# Patient Record
Sex: FEMALE | Race: WHITE | NOT HISPANIC OR LATINO
[De-identification: names, ages, dates, MRNs, and addresses within clinical notes are randomized per-mention and may not be internally consistent; named-entity substitution may affect disease eponyms.]

---

## 2017-12-20 PROBLEM — Z00.00 ENCOUNTER FOR PREVENTIVE HEALTH EXAMINATION: Status: ACTIVE | Noted: 2017-12-20

## 2017-12-21 ENCOUNTER — APPOINTMENT (OUTPATIENT)
Dept: GASTROENTEROLOGY | Facility: CLINIC | Age: 82
End: 2017-12-21
Payer: MEDICARE

## 2017-12-21 VITALS
OXYGEN SATURATION: 98 % | BODY MASS INDEX: 18.27 KG/M2 | WEIGHT: 107 LBS | HEIGHT: 64 IN | HEART RATE: 67 BPM | TEMPERATURE: 98.1 F | DIASTOLIC BLOOD PRESSURE: 70 MMHG | RESPIRATION RATE: 14 BRPM | SYSTOLIC BLOOD PRESSURE: 120 MMHG

## 2017-12-21 DIAGNOSIS — Z82.49 FAMILY HISTORY OF ISCHEMIC HEART DISEASE AND OTHER DISEASES OF THE CIRCULATORY SYSTEM: ICD-10-CM

## 2017-12-21 DIAGNOSIS — Z82.3 FAMILY HISTORY OF STROKE: ICD-10-CM

## 2017-12-21 PROCEDURE — 99205 OFFICE O/P NEW HI 60 MIN: CPT

## 2017-12-21 RX ORDER — BIMATOPROST 0.1 MG/ML
0.01 SOLUTION/ DROPS OPHTHALMIC
Refills: 0 | Status: ACTIVE | COMMUNITY

## 2017-12-21 RX ORDER — LEVOTHYROXINE SODIUM 0.03 MG/1
25 TABLET ORAL
Refills: 0 | Status: ACTIVE | COMMUNITY

## 2019-01-15 ENCOUNTER — APPOINTMENT (OUTPATIENT)
Dept: GASTROENTEROLOGY | Facility: CLINIC | Age: 84
End: 2019-01-15
Payer: MEDICARE

## 2019-01-15 VITALS
HEIGHT: 64 IN | BODY MASS INDEX: 17.93 KG/M2 | RESPIRATION RATE: 14 BRPM | DIASTOLIC BLOOD PRESSURE: 60 MMHG | OXYGEN SATURATION: 96 % | HEART RATE: 82 BPM | SYSTOLIC BLOOD PRESSURE: 120 MMHG | WEIGHT: 105 LBS | TEMPERATURE: 97.4 F

## 2019-01-15 DIAGNOSIS — R10.9 UNSPECIFIED ABDOMINAL PAIN: ICD-10-CM

## 2019-01-15 PROCEDURE — 99215 OFFICE O/P EST HI 40 MIN: CPT

## 2019-01-15 RX ORDER — PANCRELIPASE LIPASE, PANCRELIPASE PROTEASE, PANCRELIPASE AMYLASE 10000; 32000; 42000 [USP'U]/1; [USP'U]/1; [USP'U]/1
10000-32000 CAPSULE, DELAYED RELEASE ORAL
Qty: 480 | Refills: 0 | Status: ACTIVE | COMMUNITY
Start: 2018-11-07

## 2019-01-15 NOTE — HISTORY OF PRESENT ILLNESS
[FreeTextEntry1] : This is the first [facility] GI clinic visit for Ms. Godinez. She presents to the GI clinic with persistent complaints. She continues to experience episodic pain radiating from the LLQ to the mid back. The pain waxes and wanes in intensity. At its worse, she describes 6-7/10. She experiences relief from completely evacuating her bowels and more relief from Tylenol. All attempts to threat this using cathartics/laxatives, IC valve massage and hydrotherapy have not been successful. She is contemplating chemotherapy starting tomorrow for treatment of pancreatic cancer. Ms. Godinez presents today seeking relief from these symptoms.

## 2019-01-15 NOTE — ASSESSMENT
[FreeTextEntry1] : The Histor and physical examination are very much consistent with neuropathic pain. The dermo tonal pattern suggests a T4-T6 afferent. The etiology of the pain is unclear but may be related to prior surgery or persistent lymphadenopathy. Abdominal distension from constipation or SIBO clearly worsen her complaints. These will need to be optimized in addition to treating her primary problem. She is starting chemo tomorrow so treatment for SIBO may be imprudent at this time. Simple treatments using topical agents may be quite helpful. Will consider a neuromodulator if symptoms persist. \par \par Plan:\par \par 1. OTC menthol/Lidoderm patches to affected areas. \par 2. Continue OTC analgesics. \par 3. Patient will discuss with her PCP.\par \par \par

## 2019-01-15 NOTE — PHYSICAL EXAM
[General Appearance - Alert] : alert [General Appearance - In No Acute Distress] : in no acute distress [Sclera] : the sclera and conjunctiva were normal [Hearing Threshold Finger Rub Not [place]] : hearing was normal [Neck Appearance] : the appearance of the neck was normal [Bowel Sounds] : normal bowel sounds [Abdomen Soft] : soft [] : no hepato-splenomegaly [Abdomen Mass (___ Cm)] : no abdominal mass palpated [FreeTextEntry1] : Patient lying supine with knees bent. Generalized abdominal tenderness.  [Abnormal Walk] : normal gait [Skin Color & Pigmentation] : normal skin color and pigmentation [Oriented To Time, Place, And Person] : oriented to person, place, and time [Impaired Insight] : insight and judgment were intact [Affect] : the affect was normal

## 2019-03-26 ENCOUNTER — APPOINTMENT (OUTPATIENT)
Dept: GASTROENTEROLOGY | Facility: CLINIC | Age: 84
End: 2019-03-26

## 2019-03-26 ENCOUNTER — APPOINTMENT (OUTPATIENT)
Dept: GASTROENTEROLOGY | Facility: CLINIC | Age: 84
End: 2019-03-26
Payer: MEDICARE

## 2019-03-26 VITALS
RESPIRATION RATE: 14 BRPM | HEIGHT: 64 IN | TEMPERATURE: 97.5 F | DIASTOLIC BLOOD PRESSURE: 52 MMHG | WEIGHT: 108 LBS | HEART RATE: 70 BPM | BODY MASS INDEX: 18.44 KG/M2 | SYSTOLIC BLOOD PRESSURE: 100 MMHG | OXYGEN SATURATION: 98 %

## 2019-03-26 DIAGNOSIS — Z85.07 PERSONAL HISTORY OF MALIGNANT NEOPLASM OF PANCREAS: ICD-10-CM

## 2019-03-26 DIAGNOSIS — K59.00 CONSTIPATION, UNSPECIFIED: ICD-10-CM

## 2019-03-26 PROCEDURE — 99214 OFFICE O/P EST MOD 30 MIN: CPT

## 2019-03-27 PROBLEM — K59.00 CONSTIPATION: Status: ACTIVE | Noted: 2017-12-21

## 2019-03-27 PROBLEM — Z85.07 HISTORY OF MALIGNANT NEOPLASM OF PANCREAS: Status: RESOLVED | Noted: 2017-12-21 | Resolved: 2019-03-27

## 2019-03-27 NOTE — PHYSICAL EXAM
[General Appearance - Alert] : alert [General Appearance - In No Acute Distress] : in no acute distress [Sclera] : the sclera and conjunctiva were normal [Hearing Threshold Finger Rub Not [place]] : hearing was normal [Neck Appearance] : the appearance of the neck was normal [Bowel Sounds] : normal bowel sounds [Abdomen Soft] : soft [Abdomen Tenderness] : non-tender [] : no hepato-splenomegaly [Abdomen Mass (___ Cm)] : no abdominal mass palpated [Abnormal Walk] : normal gait [Oriented To Time, Place, And Person] : oriented to person, place, and time [Impaired Insight] : insight and judgment were intact [Affect] : the affect was normal

## 2019-03-27 NOTE — ASSESSMENT
[FreeTextEntry1] :  Ms. Godinez is significantly improved. Her symptoms are less bothersome and her HRQOL is good. Her current management regimen is very satisfactory and no changes are cogent at the moment. She will continue to follow up with us as necessary. \par \par

## 2019-03-27 NOTE — HISTORY OF PRESENT ILLNESS
[FreeTextEntry1] : Ms. Godinez returns to the GI clinic in follow up. She is undergoing chemotherapy for pancreatic neoplasia and is happy with her progress. Her presenting symptoms have all significantly improved. Her bloating has responded to Atrantil and her bowel habits is significantly improved with Oxy Powder as rescue. She is eating well and has been able to gain over ten pounds since her lowest weight. She presents today in routine follow up.

## 2019-06-05 ENCOUNTER — OUTPATIENT (OUTPATIENT)
Dept: OUTPATIENT SERVICES | Facility: HOSPITAL | Age: 84
LOS: 1 days | End: 2019-06-05
Payer: MEDICARE

## 2019-06-05 ENCOUNTER — APPOINTMENT (OUTPATIENT)
Dept: CT IMAGING | Facility: HOSPITAL | Age: 84
End: 2019-06-05

## 2019-06-05 DIAGNOSIS — K21.9 GASTRO-ESOPHAGEAL REFLUX DISEASE W/OUT ESOPHAGITIS: ICD-10-CM

## 2019-06-05 PROCEDURE — 74177 CT ABD & PELVIS W/CONTRAST: CPT | Mod: 26

## 2019-06-05 PROCEDURE — 74177 CT ABD & PELVIS W/CONTRAST: CPT

## 2019-07-19 ENCOUNTER — APPOINTMENT (OUTPATIENT)
Dept: OTOLARYNGOLOGY | Facility: CLINIC | Age: 84
End: 2019-07-19
Payer: MEDICARE

## 2019-07-19 VITALS
HEART RATE: 75 BPM | BODY MASS INDEX: 16.39 KG/M2 | HEIGHT: 64 IN | DIASTOLIC BLOOD PRESSURE: 57 MMHG | WEIGHT: 96 LBS | SYSTOLIC BLOOD PRESSURE: 112 MMHG

## 2019-07-19 DIAGNOSIS — H93.293 OTHER ABNORMAL AUDITORY PERCEPTIONS, BILATERAL: ICD-10-CM

## 2019-07-19 PROCEDURE — G0268 REMOVAL OF IMPACTED WAX MD: CPT

## 2019-07-19 PROCEDURE — 92593: CPT | Mod: NC

## 2019-07-19 PROCEDURE — 99213 OFFICE O/P EST LOW 20 MIN: CPT | Mod: 25

## 2019-07-22 PROBLEM — H93.293 ABNORMAL AUDITORY PERCEPTION OF BOTH EARS: Status: ACTIVE | Noted: 2019-07-22

## 2019-07-22 NOTE — ASSESSMENT
[FreeTextEntry1] : Cerumen impaction, now resolved.  The patient was counseled in aural hygiene and Qtip avoidance.  I offered an audiogram, but the patient refused.\par \par

## 2020-01-28 NOTE — HISTORY OF PRESENT ILLNESS
Orchiectomy   WHAT YOU NEED TO KNOW:   Orchiectomy, also called orchidectomy, is surgery to remove one or both of your testicles  Your testicles are egg-shaped organs that lie inside your scrotum  They are supported by your spermatic cord  DISCHARGE INSTRUCTIONS:   Medicines:   · Antibiotics: This medicine is given to fight or prevent an infection caused by bacteria  Always take your antibiotics exactly as ordered by your healthcare provider  Do not stop taking your medicine unless directed by your healthcare provider  Never save antibiotics or take leftover antibiotics that were given to you for another illness  · Antihormone medicine:  Antihormone medicine may be given if your testosterone level does not decrease enough after your surgery  · Bone strengthening medicines:  Bisphosphonates, calcium, and vitamin D supplements may be needed after orchiectomy  These medicines help decrease bone loss and reduce your risk of broken bones  · Pain medicine: You may be given a prescription medicine to decrease pain  Do not wait until the pain is severe before you take this medicine  · Take your medicine as directed  Contact your healthcare provider if you think your medicine is not helping or if you have side effects  Tell him or her if you are allergic to any medicine  Keep a list of the medicines, vitamins, and herbs you take  Include the amounts, and when and why you take them  Bring the list or the pill bottles to follow-up visits  Carry your medicine list with you in case of an emergency  Follow up with your healthcare provider as directed: You may need x-ray scans and blood tests after your surgery  Write down your questions so you remember to ask them during your visits  Ice:  Ice helps decrease swelling and pain  Ice may also help prevent tissue damage  Use an ice pack or put crushed ice in a plastic bag   Cover it with a towel, and place it on your incision for 15 to 20 minutes every hour as [de-identified] : Patient seen for recurrent cerumen impactions. Feels as if her ears are clogged for the past few weeks.  Denies otalgia, otorrhea, tinnitus, or vertigo.  Patient has no previous otologic history or acoustic trauma.\par \par  directed  Wound care: You will have a bandage over your surgery site  Do not take the bandage off until your healthcare provider says it is okay  You may also need to wear a jock strap for support and comfort  Ask for more information about caring for your wound and using your jock strap  Other treatments: If you have cancer, you may need the following after your orchiectomy:  · Chemotherapy: This medicine works by killing tumor cells that were not removed with surgery  Chemotherapy may also help cure cancer  · Radiation therapy: This is a treatment using x-rays or gamma rays  Radiation helps kill any cancer cells that may be left in your testicle area  Radiation will also help prevent any remaining cancer cells from spreading  Contact your healthcare provider if:   · You are nauseated or vomiting  · You have a cough or feel weak and achy  · You have a fever or chills  · You have questions or concerns about your condition or care  Seek care immediately or call 911 if:   · You have bad pain in your legs or your legs become very swollen  · You have lower abdominal or back pain that does not go away, even after you take medicine  · You have trouble urinating or having a bowel movement  · Your incision is swollen, red, bleeding, or has pus coming from it  · Your stitches come apart  · You feel lightheaded and short of breath  · You have chest pain when you take a deep breath or cough, or you cough up blood  · Your leg feels warm, tender, and painful  It may look swollen and red  © 2017 [address] [employer]  Information is for End User's use only and may not be sold, redistributed or otherwise used for commercial purposes  All illustrations and images included in CareNotes® are the copyrighted property of [employer]  or Rocco Castro  The above information is an  only   It is not intended as medical advice for individual conditions or treatments  Talk to your doctor, nurse or pharmacist before following any medical regimen to see if it is safe and effective for you

## 2020-02-19 ENCOUNTER — APPOINTMENT (OUTPATIENT)
Dept: OTOLARYNGOLOGY | Facility: CLINIC | Age: 85
End: 2020-02-19
Payer: MEDICARE

## 2020-02-19 VITALS
SYSTOLIC BLOOD PRESSURE: 112 MMHG | HEIGHT: 64 IN | BODY MASS INDEX: 16.39 KG/M2 | HEART RATE: 51 BPM | DIASTOLIC BLOOD PRESSURE: 71 MMHG | WEIGHT: 96 LBS

## 2020-02-19 PROCEDURE — 69210 REMOVE IMPACTED EAR WAX UNI: CPT

## 2020-02-19 PROCEDURE — 99213 OFFICE O/P EST LOW 20 MIN: CPT | Mod: 25

## 2020-02-19 RX ORDER — DEXAMETHASONE 4 MG/1
4 TABLET ORAL
Qty: 12 | Refills: 0 | Status: DISCONTINUED | COMMUNITY
Start: 2019-01-16 | End: 2020-02-19

## 2020-02-19 RX ORDER — LEUCOVORIN CALCIUM 350 MG/17.5ML
350 INJECTION, POWDER, LYOPHILIZED, FOR SOLUTION INTRAMUSCULAR; INTRAVENOUS
Qty: 2 | Refills: 0 | Status: DISCONTINUED | COMMUNITY
Start: 2019-01-09 | End: 2020-02-19

## 2020-02-19 RX ORDER — ONDANSETRON 2 MG/ML
40 INJECTION INTRAMUSCULAR; INTRAVENOUS
Qty: 40 | Refills: 0 | Status: DISCONTINUED | COMMUNITY
Start: 2019-01-09 | End: 2020-02-19

## 2020-02-19 RX ORDER — ONDANSETRON 8 MG/1
8 TABLET ORAL
Qty: 30 | Refills: 0 | Status: DISCONTINUED | COMMUNITY
Start: 2019-01-16 | End: 2020-02-19

## 2020-02-19 RX ORDER — ATORVASTATIN CALCIUM 10 MG/1
10 TABLET, FILM COATED ORAL
Refills: 0 | Status: DISCONTINUED | COMMUNITY
End: 2020-02-19

## 2020-02-19 RX ORDER — ASPIRIN 325 MG/1
TABLET, FILM COATED ORAL
Refills: 0 | Status: DISCONTINUED | COMMUNITY
End: 2020-02-19

## 2020-02-19 RX ORDER — TIMOLOL MALEATE 5 MG/ML
0.5 SOLUTION OPHTHALMIC
Qty: 5 | Refills: 0 | Status: DISCONTINUED | COMMUNITY
Start: 2019-01-07 | End: 2020-02-19

## 2020-02-19 RX ORDER — FILGRASTIM-SNDZ 300 UG/.5ML
300 INJECTION, SOLUTION INTRAVENOUS; SUBCUTANEOUS
Qty: 5 | Refills: 0 | Status: DISCONTINUED | COMMUNITY
Start: 2019-01-17 | End: 2020-02-19

## 2020-02-19 RX ORDER — FLUOROURACIL 50 MG/ML
2.5 INJECTION, SOLUTION INTRAVENOUS
Qty: 100 | Refills: 0 | Status: DISCONTINUED | COMMUNITY
Start: 2019-01-09 | End: 2020-02-19

## 2020-02-19 RX ORDER — DEXAMETHASONE SODIUM PHOSPHATE 10 MG/ML
10 INJECTION INTRAMUSCULAR; INTRAVENOUS
Qty: 4 | Refills: 0 | Status: DISCONTINUED | COMMUNITY
Start: 2019-01-09 | End: 2020-02-19

## 2020-02-19 RX ORDER — PROCHLORPERAZINE MALEATE 10 MG/1
10 TABLET ORAL
Qty: 30 | Refills: 0 | Status: DISCONTINUED | COMMUNITY
Start: 2019-01-16 | End: 2020-02-19

## 2020-02-19 RX ORDER — DEXTROSE MONOHYDRATE 25000 MG/500ML
5 INJECTION, SOLUTION INTRAVENOUS
Qty: 2000 | Refills: 0 | Status: DISCONTINUED | COMMUNITY
Start: 2019-01-10 | End: 2020-02-19

## 2020-02-19 RX ORDER — PANTOPRAZOLE 40 MG/1
40 TABLET, DELAYED RELEASE ORAL
Qty: 30 | Refills: 0 | Status: DISCONTINUED | COMMUNITY
Start: 2019-01-16 | End: 2020-02-19

## 2020-02-19 RX ORDER — DOCUSATE SODIUM 100 MG/1
100 CAPSULE ORAL
Refills: 0 | Status: DISCONTINUED | COMMUNITY
End: 2020-02-19

## 2020-02-19 RX ORDER — OXALIPLATIN 100 MG/20ML
100 INJECTION, POWDER, LYOPHILIZED, FOR SOLUTION INTRAVENOUS
Qty: 2 | Refills: 0 | Status: DISCONTINUED | COMMUNITY
Start: 2019-01-09 | End: 2020-02-19

## 2020-02-19 RX ORDER — IRINOTECAN HYDROCHLORIDE 20 MG/ML
100 INJECTION INTRAVENOUS
Qty: 10 | Refills: 0 | Status: DISCONTINUED | COMMUNITY
Start: 2019-01-09 | End: 2020-02-19

## 2020-02-19 RX ORDER — LIDOCAINE AND PRILOCAINE 25; 25 MG/G; MG/G
2.5-2.5 CREAM TOPICAL
Qty: 30 | Refills: 0 | Status: DISCONTINUED | COMMUNITY
Start: 2019-01-16 | End: 2020-02-19

## 2020-02-19 RX ORDER — SODIUM CHLORIDE 9 G/ML
0.9 INJECTION, SOLUTION INTRAVENOUS
Qty: 500 | Refills: 0 | Status: DISCONTINUED | COMMUNITY
Start: 2019-01-10 | End: 2020-02-19

## 2020-02-19 RX ORDER — OMEPRAZOLE, SODIUM BICARBONATE 40; 1100 MG/1; MG/1
40-1100 CAPSULE ORAL DAILY
Qty: 30 | Refills: 0 | Status: DISCONTINUED | COMMUNITY
Start: 2019-06-05 | End: 2020-02-19

## 2020-02-19 RX ORDER — GEMCITABINE HYDROCHLORIDE 1 G/1
1 INJECTION, POWDER, LYOPHILIZED, FOR SOLUTION INTRAVENOUS
Qty: 2 | Refills: 0 | Status: DISCONTINUED | COMMUNITY
Start: 2019-01-09 | End: 2020-02-19

## 2020-02-19 RX ORDER — LIOTHYRONINE SODIUM 5 UG/1
5 TABLET ORAL
Qty: 120 | Refills: 0 | Status: DISCONTINUED | COMMUNITY
Start: 2018-02-07 | End: 2020-02-19

## 2020-02-19 NOTE — ASSESSMENT
[FreeTextEntry1] : Cerumen impaction was removed from the ears. She felt better afterwards\par \par PLAN\par \par -findings and management options discussed in detail with the patient. \par -good aural hygiene\par -wax removal drops as needed. \par -annual audiogram. She declined testing today.\par -I recommended that she see the audiologist to have her hearing aids checked\par -follow up in approximately 6 months\par -call and return earlier if any concerns. \par

## 2020-02-19 NOTE — HISTORY OF PRESENT ILLNESS
[de-identified] : MARY PRICE is a 87 year patient With history of bilateral sensorineural hearing loss. She uses hearing aids. She is here to check her ears for wax buildup. She has had no difficulty hearing. She has no otalgia or otorrhea.

## 2020-02-26 ENCOUNTER — APPOINTMENT (OUTPATIENT)
Dept: OTOLARYNGOLOGY | Facility: CLINIC | Age: 85
End: 2020-02-26

## 2020-03-13 ENCOUNTER — APPOINTMENT (OUTPATIENT)
Dept: OTOLARYNGOLOGY | Facility: CLINIC | Age: 85
End: 2020-03-13

## 2020-07-08 ENCOUNTER — APPOINTMENT (OUTPATIENT)
Dept: OTOLARYNGOLOGY | Facility: CLINIC | Age: 85
End: 2020-07-08

## 2020-10-05 ENCOUNTER — APPOINTMENT (OUTPATIENT)
Dept: OTOLARYNGOLOGY | Facility: CLINIC | Age: 85
End: 2020-10-05
Payer: SELF-PAY

## 2020-10-05 ENCOUNTER — APPOINTMENT (OUTPATIENT)
Dept: OTOLARYNGOLOGY | Facility: CLINIC | Age: 85
End: 2020-10-05
Payer: MEDICARE

## 2020-10-05 VITALS — BODY MASS INDEX: 16.39 KG/M2 | TEMPERATURE: 97.2 F | WEIGHT: 96 LBS | HEIGHT: 64 IN

## 2020-10-05 PROCEDURE — 99213 OFFICE O/P EST LOW 20 MIN: CPT | Mod: 25

## 2020-10-05 PROCEDURE — 92592: CPT | Mod: NC

## 2020-10-05 PROCEDURE — 92567 TYMPANOMETRY: CPT

## 2020-10-05 PROCEDURE — 69210 REMOVE IMPACTED EAR WAX UNI: CPT

## 2020-10-05 PROCEDURE — 92557 COMPREHENSIVE HEARING TEST: CPT

## 2020-10-05 RX ORDER — MANGANESE CHLORIDE 0.1 MG/ML
0.1 INJECTION, SOLUTION INTRAVENOUS
Qty: 20 | Refills: 0 | Status: DISCONTINUED | COMMUNITY
Start: 2019-12-04 | End: 2020-10-05

## 2020-10-05 RX ORDER — LIFITEGRAST 50 MG/ML
5 SOLUTION/ DROPS OPHTHALMIC
Qty: 60 | Refills: 0 | Status: DISCONTINUED | COMMUNITY
Start: 2019-10-31 | End: 2020-10-05

## 2020-10-05 RX ORDER — LEVOTHYROXINE SODIUM 0.05 MG/1
50 TABLET ORAL
Qty: 90 | Refills: 0 | Status: DISCONTINUED | COMMUNITY
Start: 2017-12-31 | End: 2020-10-05

## 2020-10-05 RX ORDER — CUPRIC CHLORIDE 0.4 MG/ML
0.4 INJECTION, SOLUTION INTRAVENOUS
Qty: 10 | Refills: 0 | Status: DISCONTINUED | COMMUNITY
Start: 2020-01-27 | End: 2020-10-05

## 2020-10-05 RX ORDER — CYCLOBENZAPRINE HYDROCHLORIDE 5 MG/1
5 TABLET, FILM COATED ORAL
Qty: 30 | Refills: 0 | Status: DISCONTINUED | COMMUNITY
Start: 2019-11-22 | End: 2020-10-05

## 2020-10-05 RX ORDER — NEPAFENAC 3 MG/ML
0.3 SUSPENSION/ DROPS OPHTHALMIC
Qty: 2 | Refills: 0 | Status: DISCONTINUED | COMMUNITY
Start: 2019-10-08 | End: 2020-10-05

## 2020-10-05 RX ORDER — DORZOLAMIDE HYDROCHLORIDE 20 MG/ML
2 SOLUTION OPHTHALMIC
Qty: 10 | Refills: 0 | Status: DISCONTINUED | COMMUNITY
Start: 2019-07-25 | End: 2020-10-05

## 2020-10-05 RX ORDER — NETARSUDIL AND LATANOPROST OPHTHALMIC SOLUTION, 0.02%/0.005% .2; .05 MG/ML; MG/ML
0.02-0.005 SOLUTION/ DROPS OPHTHALMIC; TOPICAL
Qty: 2 | Refills: 0 | Status: DISCONTINUED | COMMUNITY
Start: 2019-11-05 | End: 2020-10-05

## 2020-10-05 NOTE — ASSESSMENT
[FreeTextEntry1] : Cerumen impaction was removed bilaterally. Her hearing is stable. \par \par PLAN\par \par -findings and management options discussed in detail with the patient. \par -good aural hygiene\par -annual audiogram\par -follow up with the audiologist as needed to have her hearing aids checked. \par -follow up in 6 months to check for cerumen\par -call and return earlier if any concerns. \par \par

## 2020-10-05 NOTE — HISTORY OF PRESENT ILLNESS
[de-identified] : MARY PRICE is a 87 year patient Here for cerumen impaction and hearing loss. She has been doing well.  She has no otalgia or otorrhea.  She uses hearing aids. She only has the left one with her today. Her last audiogram was in 2018.

## 2021-03-31 ENCOUNTER — APPOINTMENT (OUTPATIENT)
Dept: OTOLARYNGOLOGY | Facility: CLINIC | Age: 86
End: 2021-03-31

## 2021-03-31 ENCOUNTER — APPOINTMENT (OUTPATIENT)
Dept: OTOLARYNGOLOGY | Facility: CLINIC | Age: 86
End: 2021-03-31
Payer: MEDICARE

## 2021-03-31 DIAGNOSIS — H61.21 IMPACTED CERUMEN, RIGHT EAR: ICD-10-CM

## 2021-03-31 PROCEDURE — 69210 REMOVE IMPACTED EAR WAX UNI: CPT

## 2021-03-31 PROCEDURE — 99212 OFFICE O/P EST SF 10 MIN: CPT | Mod: 25

## 2021-03-31 PROCEDURE — 92557 COMPREHENSIVE HEARING TEST: CPT

## 2021-03-31 PROCEDURE — 92567 TYMPANOMETRY: CPT

## 2021-03-31 NOTE — HISTORY OF PRESENT ILLNESS
[de-identified] : MARY PRICE is a 88 year patient with a history of bilateral sensorineural hearing loss. She uses hearing aid. She had a sudden decrease in hearing in her right ear a few days ago. She has no otalgia, otorrhea, tinnitus, or dizziness. She thinks it could be wax.

## 2021-03-31 NOTE — ASSESSMENT
[FreeTextEntry1] : She had cerumen in the right ear which was removed. Repeat audiogram showed no change in her hearing. The audiologist checked her hearing aid and there was blockage by wax in her hearing aid. She felt better after it was cleaned. \par \par PLAN\par \par -findings and management options discussed in detail with the patient. \par -good aural hygiene\par -annual audiogram\par -follow up with the audiologist as needed  \par -follow up in 6 months or earlier if needed

## 2021-04-05 ENCOUNTER — APPOINTMENT (OUTPATIENT)
Dept: OTOLARYNGOLOGY | Facility: CLINIC | Age: 86
End: 2021-04-05

## 2021-06-10 ENCOUNTER — APPOINTMENT (OUTPATIENT)
Dept: OTOLARYNGOLOGY | Facility: CLINIC | Age: 86
End: 2021-06-10
Payer: SELF-PAY

## 2021-06-10 PROCEDURE — 92593: CPT | Mod: NC

## 2021-10-04 ENCOUNTER — APPOINTMENT (OUTPATIENT)
Dept: OTOLARYNGOLOGY | Facility: CLINIC | Age: 86
End: 2021-10-04
Payer: MEDICARE

## 2021-10-04 PROCEDURE — 69210 REMOVE IMPACTED EAR WAX UNI: CPT

## 2021-10-04 PROCEDURE — 99212 OFFICE O/P EST SF 10 MIN: CPT | Mod: 25

## 2021-10-04 NOTE — HISTORY OF PRESENT ILLNESS
[de-identified] : MARY PRICE is a 88 year patient here for right ear pain when she puts her hearing aid in.  She has no otorrhea, tinnitus or dizziness.  \par \par ENT History\par She has bilateral SNHL and wears hearing aids

## 2021-10-04 NOTE — ASSESSMENT
[FreeTextEntry1] : Cerumen was removed from the ears.  THe pain resolved. \par \par PLAN\par \par -findings and management options discussed in detail with the patient. \par -good aural hygiene\par -annual audiogram\par -follow up with the audiologist as needed  to check her hearing aids\par -follow up in 6 months or earlier if needed

## 2021-11-19 ENCOUNTER — APPOINTMENT (OUTPATIENT)
Dept: OTOLARYNGOLOGY | Facility: CLINIC | Age: 86
End: 2021-11-19
Payer: SELF-PAY

## 2021-11-19 PROCEDURE — 92593: CPT | Mod: NC

## 2021-12-08 ENCOUNTER — APPOINTMENT (OUTPATIENT)
Dept: OTOLARYNGOLOGY | Facility: CLINIC | Age: 86
End: 2021-12-08
Payer: MEDICARE

## 2021-12-08 VITALS — WEIGHT: 112 LBS | HEIGHT: 64 IN | BODY MASS INDEX: 19.12 KG/M2

## 2021-12-08 PROCEDURE — 92504 EAR MICROSCOPY EXAMINATION: CPT

## 2021-12-08 PROCEDURE — 99212 OFFICE O/P EST SF 10 MIN: CPT | Mod: 25

## 2021-12-08 NOTE — ASSESSMENT
[FreeTextEntry1] : She has recurrent right ear pain. There was no infection or significant cerumen impaction. I discussed the possible etiologies of referred ear pain. She thinks it may be from her tooth.\par \par PLAN\par \par -findings and management options discussed in detail with the patient. \par -good aural hygiene\par -She is going to see her dentist to see if there is a dental source. I spoke with her about performing flexible laryngoscopy. She deferred today. If the pain persists and no dental source is found, she will return for further workup\par -She may try wearing the hearing aid to see if it causes discomfort.\par -I asked to call me after she sees the dentist and let me know what is found\par -call and return urgently if any worsening symptoms or concerns

## 2021-12-08 NOTE — HISTORY OF PRESENT ILLNESS
[de-identified] : MARY PRICE is a 89 year patient With recurrent right ear pain. She said that the pain did resolve after her last visit. She started using her hearing aid and the pain returned. She stopped wearing the hearing aid but the pain has persisted. She has no otorrhea, tinnitus, or dizziness. She has no nasal or throat symptoms. It is possible that it could be from a dental source-she does have some sensitivity of the tooth on that side \par \par ENT History\par She has bilateral SNHL and wears hearing aids

## 2022-04-04 ENCOUNTER — APPOINTMENT (OUTPATIENT)
Dept: OTOLARYNGOLOGY | Facility: CLINIC | Age: 87
End: 2022-04-04
Payer: MEDICARE

## 2022-04-04 ENCOUNTER — APPOINTMENT (OUTPATIENT)
Dept: OTOLARYNGOLOGY | Facility: CLINIC | Age: 87
End: 2022-04-04

## 2022-04-04 VITALS — HEIGHT: 63 IN | TEMPERATURE: 97.2 F | WEIGHT: 112 LBS | BODY MASS INDEX: 19.84 KG/M2

## 2022-04-04 PROCEDURE — 92567 TYMPANOMETRY: CPT

## 2022-04-04 PROCEDURE — 92504 EAR MICROSCOPY EXAMINATION: CPT

## 2022-04-04 PROCEDURE — 99213 OFFICE O/P EST LOW 20 MIN: CPT | Mod: 25

## 2022-04-04 PROCEDURE — 92557 COMPREHENSIVE HEARING TEST: CPT

## 2022-04-04 NOTE — ASSESSMENT
[FreeTextEntry1] : She had scant cerumen today which was removed.  Her ears were otherwise normal on exam.  Audiogram showed stable hearing\par \par PLAN\par \par -findings and management options discussed in detail with the patient. \par -good aural hygiene\par -annual audiogram\par -Follow-up with the audiologist as needed to have her hearing aids checked\par -Follow-up in 1 year or earlier if needed to check her ears\par

## 2022-04-04 NOTE — HISTORY OF PRESENT ILLNESS
[de-identified] : MARY PRICE is a 89 year old patient with a history of bilateral sensorineural hearing loss.  She uses hearing aids.  She is here to check for cerumen impaction.  She had ear pain at her last visit but that has resolved.  She has no complaints today\par \par ENT History\par She has bilateral SNHL and wears hearing aids \par

## 2022-11-21 ENCOUNTER — APPOINTMENT (OUTPATIENT)
Dept: OTOLARYNGOLOGY | Facility: CLINIC | Age: 87
End: 2022-11-21

## 2022-11-21 VITALS — HEIGHT: 63 IN | BODY MASS INDEX: 18.96 KG/M2 | TEMPERATURE: 97.6 F | WEIGHT: 107 LBS

## 2022-11-21 DIAGNOSIS — H92.01 OTALGIA, RIGHT EAR: ICD-10-CM

## 2022-11-21 PROCEDURE — 69210 REMOVE IMPACTED EAR WAX UNI: CPT

## 2022-11-21 PROCEDURE — 99212 OFFICE O/P EST SF 10 MIN: CPT | Mod: 25

## 2022-11-21 RX ORDER — NIRMATRELVIR AND RITONAVIR 300-100 MG
20 X 150 MG & KIT ORAL
Qty: 30 | Refills: 0 | Status: ACTIVE | COMMUNITY
Start: 2022-10-21

## 2022-11-21 NOTE — HISTORY OF PRESENT ILLNESS
[de-identified] : MARY PRICE is a 89 year old patient with a history of bilateral sensorineural hearing loss and recurrent cerumen impaction.  She has had some discomfort in the right ear while wearing the hearing aid.  She has no otorrhea or other ear symptoms.  She occasionally has a sharp pain over the left temple.  She has no sinus symptoms.\par \par ENT history\par She has bilateral sensorineural hearing loss and wears hearing aids

## 2022-11-21 NOTE — ASSESSMENT
[FreeTextEntry1] : Cerumen impaction and straight hairs removed from the ear canals.  She felt better afterwards.  I think that was likely the source of the ear pain \par \par PLAN\par \par -findings and management options discussed in detail with the patient. \par -Continue good aural hygiene\par -annual audiogram\par -Follow-up with the audiologist as needed to have her hearing aids checked\par -I have asked her let me know if the ear pain continues.  It is unclear what causes the intermittent left temple pain.  I asked her speak with her PCP about this.  She may also consider neurology evaluation\par -Follow-up in 5 to 6 months or earlier if needed to check her ears\par

## 2023-03-06 ENCOUNTER — APPOINTMENT (OUTPATIENT)
Dept: OTOLARYNGOLOGY | Facility: CLINIC | Age: 88
End: 2023-03-06

## 2023-03-24 ENCOUNTER — APPOINTMENT (OUTPATIENT)
Dept: OTOLARYNGOLOGY | Facility: CLINIC | Age: 88
End: 2023-03-24
Payer: MEDICARE

## 2023-03-24 VITALS — WEIGHT: 107 LBS | HEIGHT: 63 IN | BODY MASS INDEX: 18.96 KG/M2

## 2023-03-24 PROCEDURE — 69210 REMOVE IMPACTED EAR WAX UNI: CPT

## 2023-03-24 PROCEDURE — 99212 OFFICE O/P EST SF 10 MIN: CPT | Mod: 25

## 2023-03-24 NOTE — ASSESSMENT
[FreeTextEntry1] : She had cerumen impaction and stray hairs in the ear canals.  The ears were cleaned.  She felt better afterwards.\par \par PLAN\par \par -findings and management options discussed in detail with the patient. \par -Continue good aural hygiene\par -annual audiogram-she is unable to do the test today but will do it at her next visit\par -Follow-up with the audiologist as needed to have her hearing aids checked\par -Follow-up in approximately 3 months or earlier if needed to check your ears

## 2023-03-24 NOTE — HISTORY OF PRESENT ILLNESS
[de-identified] : MARY PRICE is a 89 year old patient here for bilateral sensorineural hearing loss and recurrent cerumen impaction.  She has mild discomfort again in the right ear.  It occurs when she wears a hearing aid.  She had something similar last time which improved after the ears were cleaned.  She has no other complaints\par \par ENT history\par She has bilateral sensorineural hearing loss and wears hearing aids

## 2023-06-02 ENCOUNTER — APPOINTMENT (OUTPATIENT)
Dept: OTOLARYNGOLOGY | Facility: CLINIC | Age: 88
End: 2023-06-02

## 2023-06-05 ENCOUNTER — APPOINTMENT (OUTPATIENT)
Dept: OTOLARYNGOLOGY | Facility: CLINIC | Age: 88
End: 2023-06-05

## 2023-06-23 ENCOUNTER — APPOINTMENT (OUTPATIENT)
Dept: OTOLARYNGOLOGY | Facility: CLINIC | Age: 88
End: 2023-06-23

## 2023-07-07 ENCOUNTER — APPOINTMENT (OUTPATIENT)
Dept: OTOLARYNGOLOGY | Facility: CLINIC | Age: 88
End: 2023-07-07
Payer: MEDICARE

## 2023-07-07 VITALS — BODY MASS INDEX: 18.96 KG/M2 | HEIGHT: 63 IN | WEIGHT: 107 LBS

## 2023-07-07 DIAGNOSIS — H61.23 IMPACTED CERUMEN, BILATERAL: ICD-10-CM

## 2023-07-07 DIAGNOSIS — H90.3 SENSORINEURAL HEARING LOSS, BILATERAL: ICD-10-CM

## 2023-07-07 PROCEDURE — 92504 EAR MICROSCOPY EXAMINATION: CPT

## 2023-07-07 PROCEDURE — 99212 OFFICE O/P EST SF 10 MIN: CPT | Mod: 25

## 2023-07-07 NOTE — ASSESSMENT
[FreeTextEntry1] : She had again had cerumen and stray hairs in the ear canals which were removed.\par \par PLAN\par \par -findings and management options discussed in detail with the patient. \par -Continue good aural hygiene\par -Repeat audiogram at next visit\par -Follow-up in approximately 3 months or earlier if needed to check her ears

## 2023-07-07 NOTE — HISTORY OF PRESENT ILLNESS
[de-identified] : MARY PRICE is a 89 year old patient here for recurrent cerumen impaction.  She still has some discomfort when she uses her right hearing aid.  She has no otorrhea.\par \par ENT history\par She has bilateral sensorineural hearing loss and wears hearing aids

## 2023-08-03 ENCOUNTER — APPOINTMENT (OUTPATIENT)
Dept: OTOLARYNGOLOGY | Facility: CLINIC | Age: 88
End: 2023-08-03

## 2023-08-17 ENCOUNTER — APPOINTMENT (OUTPATIENT)
Dept: OTOLARYNGOLOGY | Facility: CLINIC | Age: 88
End: 2023-08-17
Payer: SELF-PAY

## 2023-08-17 PROCEDURE — 92595: CPT

## 2023-08-31 ENCOUNTER — APPOINTMENT (OUTPATIENT)
Dept: OTOLARYNGOLOGY | Facility: CLINIC | Age: 88
End: 2023-08-31
Payer: SELF-PAY

## 2023-08-31 PROCEDURE — 92593: CPT | Mod: NC

## 2023-11-29 ENCOUNTER — NON-APPOINTMENT (OUTPATIENT)
Age: 88
End: 2023-11-29

## 2023-12-05 ENCOUNTER — APPOINTMENT (OUTPATIENT)
Dept: OTOLARYNGOLOGY | Facility: CLINIC | Age: 88
End: 2023-12-05
Payer: SELF-PAY

## 2023-12-05 PROCEDURE — V5257A: CUSTOM | Mod: RT

## 2023-12-21 ENCOUNTER — APPOINTMENT (OUTPATIENT)
Dept: OTOLARYNGOLOGY | Facility: CLINIC | Age: 88
End: 2023-12-21
Payer: SELF-PAY

## 2023-12-21 PROCEDURE — 92593: CPT | Mod: NC

## 2024-03-19 ENCOUNTER — APPOINTMENT (OUTPATIENT)
Dept: OTOLARYNGOLOGY | Facility: CLINIC | Age: 89
End: 2024-03-19

## 2024-04-26 ENCOUNTER — APPOINTMENT (OUTPATIENT)
Dept: OTOLARYNGOLOGY | Facility: CLINIC | Age: 89
End: 2024-04-26

## 2024-04-29 ENCOUNTER — APPOINTMENT (OUTPATIENT)
Dept: OTOLARYNGOLOGY | Facility: CLINIC | Age: 89
End: 2024-04-29

## 2024-06-20 ENCOUNTER — APPOINTMENT (OUTPATIENT)
Dept: OTOLARYNGOLOGY | Facility: CLINIC | Age: 89
End: 2024-06-20

## 2024-07-22 ENCOUNTER — APPOINTMENT (OUTPATIENT)
Dept: OTOLARYNGOLOGY | Facility: CLINIC | Age: 89
End: 2024-07-22

## 2024-08-12 ENCOUNTER — APPOINTMENT (OUTPATIENT)
Dept: OTOLARYNGOLOGY | Facility: CLINIC | Age: 89
End: 2024-08-12
Payer: MEDICARE

## 2024-08-12 DIAGNOSIS — H92.01 OTALGIA, RIGHT EAR: ICD-10-CM

## 2024-08-12 DIAGNOSIS — H90.3 SENSORINEURAL HEARING LOSS, BILATERAL: ICD-10-CM

## 2024-08-12 DIAGNOSIS — H61.23 IMPACTED CERUMEN, BILATERAL: ICD-10-CM

## 2024-08-12 PROCEDURE — G0268 REMOVAL OF IMPACTED WAX MD: CPT

## 2024-08-12 PROCEDURE — 92567 TYMPANOMETRY: CPT

## 2024-08-12 PROCEDURE — 99213 OFFICE O/P EST LOW 20 MIN: CPT | Mod: 25

## 2024-08-12 PROCEDURE — 92557 COMPREHENSIVE HEARING TEST: CPT

## 2024-08-12 NOTE — HISTORY OF PRESENT ILLNESS
[de-identified] : MARY PRICE is a 89 year old patient Here to check her ears.  She has bilateral sensorineural hearing loss and uses hearing aids.  She has had some discomfort in the right ear.  She thinks she may have wax buildup.  She has no otorrhea  ENT history She has bilateral sensorineural hearing loss and wears hearing aids

## 2024-08-12 NOTE — PHYSICAL EXAM
[TextEntry] : General: The patient was alert, oriented and in no distress. Voice was clear.  Face: The patient had no facial asymmetry or mass. The skin was unremarkable.  Ears: External ears were normal Ear canals: severe cerumen impaction obstructing the ear canals and TMs.  see microscopy  PROCEDURE- EAR MICROSCOPY   Indication: ear pain and history of cerumen impaction Under the microscope, the ears were evaluated. there was severe cerumen impaction obstructing the ear canals and TMs. cerumen and stray hairs were removed with a forceps from both ears.  Canal: Dry skin.  No inflammation.  Tympanic membrane: Intact. no perforation or effusion.  Nose: The external nose had no significant deformity.   On anterior rhinoscopy, the nasal mucosa was normal.  The anterior septum was grossly midline. There were no visualized polyps, purulence  or masses.  Oral cavity: Oral mucosa- normal. Oral and base of tongue- clear and without mass. Gingival and buccal mucosa- moist and without lesions. Palate- the palate moved well. There was no cleft palate. There appeared to be good salivary flow.  Oral cavity/oropharynx- no pus, erythema or mass  Neck: The neck was symmetrical. The parotid and submandibular glands were normal without masses. The trachea was midline and there was no unusual crepitus. Thyroid was smooth and nontender and no masses were palpated. No masses  Lymphatics: Cervical adenopathy- none.

## 2024-08-12 NOTE — ASSESSMENT
[FreeTextEntry1] : She had severe cerumen impaction bilaterally which was removed.  There was no ear infection.  Audiogram showed a stable bilateral sensorineural hearing loss  Plan -Findings and management options were discussed with the patient. -Continue good ear hygiene - Monitor hearing - She should follow-up with her audiologist to have her hearing aids checked - Follow-up in 1 year or earlier if needed to check her ears

## 2024-10-21 ENCOUNTER — APPOINTMENT (OUTPATIENT)
Dept: OTOLARYNGOLOGY | Facility: CLINIC | Age: 89
End: 2024-10-21
Payer: MEDICARE

## 2024-10-21 DIAGNOSIS — H92.01 OTALGIA, RIGHT EAR: ICD-10-CM

## 2024-10-21 DIAGNOSIS — J31.0 CHRONIC RHINITIS: ICD-10-CM

## 2024-10-21 DIAGNOSIS — H90.3 SENSORINEURAL HEARING LOSS, BILATERAL: ICD-10-CM

## 2024-10-21 PROCEDURE — 31231 NASAL ENDOSCOPY DX: CPT

## 2024-10-21 PROCEDURE — 99213 OFFICE O/P EST LOW 20 MIN: CPT | Mod: 25

## 2024-10-21 RX ORDER — SODIUM CHLORIDE/ALOE VERA
SPRAY, NON-AEROSOL (ML) NASAL
Qty: 1 | Refills: 3 | Status: ACTIVE | COMMUNITY
Start: 2024-10-21 | End: 1900-01-01

## 2024-10-21 RX ORDER — MUPIROCIN 20 MG/G
2 OINTMENT TOPICAL
Qty: 1 | Refills: 1 | Status: ACTIVE | COMMUNITY
Start: 2024-10-21 | End: 1900-01-01

## 2025-04-28 ENCOUNTER — APPOINTMENT (OUTPATIENT)
Dept: OTOLARYNGOLOGY | Facility: CLINIC | Age: 89
End: 2025-04-28
Payer: MEDICARE

## 2025-04-28 DIAGNOSIS — H90.3 SENSORINEURAL HEARING LOSS, BILATERAL: ICD-10-CM

## 2025-04-28 DIAGNOSIS — H92.01 OTALGIA, RIGHT EAR: ICD-10-CM

## 2025-04-28 PROCEDURE — 99213 OFFICE O/P EST LOW 20 MIN: CPT

## 2025-05-21 ENCOUNTER — NON-APPOINTMENT (OUTPATIENT)
Age: 89
End: 2025-05-21

## 2025-05-21 ENCOUNTER — APPOINTMENT (OUTPATIENT)
Dept: PHYSICAL MEDICINE AND REHAB | Facility: CLINIC | Age: 89
End: 2025-05-21
Payer: MEDICARE

## 2025-05-21 VITALS
BODY MASS INDEX: 18.78 KG/M2 | WEIGHT: 106 LBS | HEART RATE: 66 BPM | RESPIRATION RATE: 18 BRPM | SYSTOLIC BLOOD PRESSURE: 118 MMHG | DIASTOLIC BLOOD PRESSURE: 44 MMHG | HEIGHT: 63 IN

## 2025-05-21 DIAGNOSIS — Z99.89 DEPENDENCE ON OTHER ENABLING MACHINES AND DEVICES: ICD-10-CM

## 2025-05-21 DIAGNOSIS — R29.3 ABNORMAL POSTURE: ICD-10-CM

## 2025-05-21 DIAGNOSIS — M79.9 SOFT TISSUE DISORDER, UNSPECIFIED: ICD-10-CM

## 2025-05-21 DIAGNOSIS — Z86.39 PERSONAL HISTORY OF OTHER ENDOCRINE, NUTRITIONAL AND METABOLIC DISEASE: ICD-10-CM

## 2025-05-21 DIAGNOSIS — M54.59 OTHER LOW BACK PAIN: ICD-10-CM

## 2025-05-21 DIAGNOSIS — M41.9 SCOLIOSIS, UNSPECIFIED: ICD-10-CM

## 2025-05-21 DIAGNOSIS — R26.89 OTHER ABNORMALITIES OF GAIT AND MOBILITY: ICD-10-CM

## 2025-05-21 DIAGNOSIS — R20.8 OTHER DISTURBANCES OF SKIN SENSATION: ICD-10-CM

## 2025-05-21 DIAGNOSIS — G60.9 HEREDITARY AND IDIOPATHIC NEUROPATHY, UNSPECIFIED: ICD-10-CM

## 2025-05-21 PROCEDURE — G2211 COMPLEX E/M VISIT ADD ON: CPT

## 2025-05-21 PROCEDURE — 99205 OFFICE O/P NEW HI 60 MIN: CPT

## 2025-05-21 RX ORDER — DONEPEZIL HYDROCHLORIDE 23 MG/1
TABLET, FILM COATED ORAL
Refills: 0 | Status: ACTIVE | COMMUNITY

## 2025-05-21 RX ORDER — ROSUVASTATIN CALCIUM 5 MG/1
5 TABLET, FILM COATED ORAL
Refills: 0 | Status: ACTIVE | COMMUNITY

## 2025-05-21 RX ORDER — VALSARTAN 40 MG/1
TABLET, COATED ORAL
Refills: 0 | Status: ACTIVE | COMMUNITY

## 2025-06-06 ENCOUNTER — APPOINTMENT (OUTPATIENT)
Dept: OTOLARYNGOLOGY | Facility: CLINIC | Age: 89
End: 2025-06-06
Payer: SELF-PAY

## 2025-06-06 PROCEDURE — 92593: CPT | Mod: NC

## 2025-06-27 ENCOUNTER — APPOINTMENT (OUTPATIENT)
Dept: OTOLARYNGOLOGY | Facility: CLINIC | Age: 89
End: 2025-06-27